# Patient Record
Sex: FEMALE | Race: WHITE
[De-identification: names, ages, dates, MRNs, and addresses within clinical notes are randomized per-mention and may not be internally consistent; named-entity substitution may affect disease eponyms.]

---

## 2020-08-03 ENCOUNTER — HOSPITAL ENCOUNTER (EMERGENCY)
Dept: HOSPITAL 33 - ED | Age: 40
Discharge: HOME | End: 2020-08-03
Payer: COMMERCIAL

## 2020-08-03 VITALS — HEART RATE: 100 BPM | SYSTOLIC BLOOD PRESSURE: 142 MMHG | DIASTOLIC BLOOD PRESSURE: 75 MMHG | OXYGEN SATURATION: 99 %

## 2020-08-03 DIAGNOSIS — L02.02: Primary | ICD-10-CM

## 2020-08-03 PROCEDURE — 99283 EMERGENCY DEPT VISIT LOW MDM: CPT

## 2020-08-03 RX ADMIN — SULFAMETHOXAZOLE AND TRIMETHOPRIM ONE TAB: 800; 160 TABLET ORAL at 14:28

## 2020-08-03 NOTE — ERPHSYRPT
- History of Present Illness


Time Seen by Provider: 08/03/20 14:04


Source: patient


Exam Limitations: no limitations


Physician History: 





This is a 40-year-old female who has no known drug allergies and presents with 

approximately 3-week history of left facial boil.  It is increased in size.  

Patient did try to squeeze it.  There was no pus drainage from the site.  

Patient has no known history of MRSA.  Patient has not had any fevers.  Patient 

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 

facility.  


Timing/Duration: week(s) (3 weeks), worse


Quality: painful (Mildly)


Severity: mild


Location: face


Possible Causes: other (Possible folliculitis)


Associated Symptoms: denies symptoms


Allergies/Adverse Reactions: 








No Known Drug Allergies Allergy (Verified 08/03/20 14:24)


   








Travel Risk





- International Travel


Have you traveled outside of the country in past 3 weeks: No





- Coronavirus Screening


Are you exhibiting any of the following symptoms?: No


Close contact with a COVID-19 positive Pt in past 14-21 Days: No





- Review of Systems


Constitutional: No Symptoms


Eyes: No Symptoms


Ears, Nose, & Throat: No Symptoms


Respiratory: No Symptoms


Cardiac: No Symptoms


Abdominal/Gastrointestinal: No Symptoms


Genitourinary Symptoms: No Symptoms


Musculoskeletal: No Symptoms


Skin: Other (Localized raised skin lesion left face)


Neurological: No Symptoms


Psychological: No Symptoms


Endocrine: No Symptoms


Hematologic/Lymphatic: No Symptoms


Immunological/Allergic: No Symptoms


All Other Systems: Reviewed and Negative





- Past Medical History


Pertinent Past Medical History: Yes


Neurological History: No Pertinent History


ENT History: No Pertinent History


Cardiac History: No Pertinent History


Respiratory History: No Pertinent History


Endocrine Medical History: No Pertinent History


Musculoskeletal History: No Pertinent History


GI Medical History: No Pertinent History


 History: No Pertinent History


Psycho-Social History: No Pertinent History


Female Reproductive Disorders: No Pertinent History





- Past Surgical History


Past Surgical History: No


Neuro Surgical History: No Pertinent History


Cardiac: No Pertinent History


Respiratory: No Pertinent History


Gastrointestinal: No Pertinent History


Genitourinary: No Pertinent History


Musculoskeletal: No Pertinent History


Female Surgical History: No Pertinent History


Significant Family History: polycystic ovarian disease; endometriosis; mother 

and sister





- Nursing Vital Signs


Nursing Vital Signs: 


                               Initial Vital Signs











Temperature  98.4 F   08/03/20 14:09


 


Pulse Rate  100 H  08/03/20 14:09


 


Blood Pressure  142/75   08/03/20 14:09


 


O2 Sat by Pulse Oximetry  99   08/03/20 14:09








                                   Pain Scale











Pain Intensity                 0

















- Physical Exam


General Appearance: no apparent distress, alert, anxiety


Eye Exam: PERRL/EOMI, eyes nml inspection


Ears, Nose, Throat Exam: normal ENT inspection, moist mucous membranes


Neck Exam: normal inspection, non-tender, supple, full range of motion


Respiratory Exam: normal breath sounds, lungs clear, airway intact, No chest 

tenderness, No respiratory distress


Cardiovascular Exam: regular rate/rhythm, normal heart sounds, normal peripheral

pulses


Gastrointestinal/Abdomen Exam: soft, normal bowel sounds, No tenderness


Pelvic Exam: not done


Rectal Exam: not done


Back Exam: normal inspection, normal range of motion, No CVA tenderness, No 

vertebral tenderness


Extremity Exam: normal inspection, normal range of motion, pelvis stable


Neurologic Exam: alert, oriented x 3, cooperative, CNs II-XII nml as tested, 

normal mood/affect, nml cerebellar function, nml station & gait, sensation nml


Skin Exam: other (Raised 1 to 1-1/2 cm in diameter skin lesion that is slightly 

tender.  There is no evidence of pus.  The area is indurated.)


Lymphatic Exam: No adenopathy


**SpO2 Interpretation**: normal


O2 Delivery: Room Air





- Course


Nursing assessment & vital signs reviewed: Yes





- Progress


Progress: unchanged


Counseled pt/family regarding: diagnosis, need for follow-up





- Departure


Departure Disposition: Home


Clinical Impression: 


 Boil, face





Condition: Stable


Critical Care Time: No


Additional Instructions: 


Keep facial area cleaned with soap and water daily.  Do not squeeze the site.  

Use over-the-counter boil ease product as instructed on the tube and package 

insert.  Warm compress to left cheek 3 times a day.  Return to the emergency 

department tomorrow for reevaluation.  Take medication as prescribed.


Forms:  Work/School Release Form


Prescriptions: 


Smz/Tmp Ds Tablet*** [Bactrim Ds Tablet***] 1 udtab PO BID #14 tablet